# Patient Record
Sex: FEMALE | Race: OTHER | Employment: UNEMPLOYED | URBAN - METROPOLITAN AREA
[De-identification: names, ages, dates, MRNs, and addresses within clinical notes are randomized per-mention and may not be internally consistent; named-entity substitution may affect disease eponyms.]

---

## 2024-01-15 ENCOUNTER — HOSPITAL ENCOUNTER (EMERGENCY)
Age: 39
Discharge: HOME | End: 2024-01-15
Attending: EMERGENCY MEDICINE
Payer: COMMERCIAL

## 2024-01-15 ENCOUNTER — APPOINTMENT (OUTPATIENT)
Dept: GENERAL RADIOLOGY | Age: 39
End: 2024-01-15
Payer: COMMERCIAL

## 2024-01-15 VITALS
SYSTOLIC BLOOD PRESSURE: 144 MMHG | TEMPERATURE: 97.2 F | HEART RATE: 88 BPM | DIASTOLIC BLOOD PRESSURE: 91 MMHG | OXYGEN SATURATION: 98 % | WEIGHT: 219.14 LBS | RESPIRATION RATE: 16 BRPM

## 2024-01-15 DIAGNOSIS — G89.29 CHRONIC PAIN OF LEFT WRIST: Primary | ICD-10-CM

## 2024-01-15 DIAGNOSIS — M25.522 CHRONIC ELBOW PAIN, LEFT: ICD-10-CM

## 2024-01-15 DIAGNOSIS — G89.29 CHRONIC ELBOW PAIN, LEFT: ICD-10-CM

## 2024-01-15 DIAGNOSIS — M25.532 CHRONIC PAIN OF LEFT WRIST: Primary | ICD-10-CM

## 2024-01-15 PROCEDURE — 29125 APPL SHORT ARM SPLINT STATIC: CPT | Performed by: EMERGENCY MEDICINE

## 2024-01-15 PROCEDURE — 6370000000 HC RX 637 (ALT 250 FOR IP): Performed by: STUDENT IN AN ORGANIZED HEALTH CARE EDUCATION/TRAINING PROGRAM

## 2024-01-15 PROCEDURE — 73080 X-RAY EXAM OF ELBOW: CPT

## 2024-01-15 PROCEDURE — 73110 X-RAY EXAM OF WRIST: CPT

## 2024-01-15 PROCEDURE — 99284 EMERGENCY DEPT VISIT MOD MDM: CPT | Performed by: EMERGENCY MEDICINE

## 2024-01-15 PROCEDURE — 6360000002 HC RX W HCPCS: Performed by: STUDENT IN AN ORGANIZED HEALTH CARE EDUCATION/TRAINING PROGRAM

## 2024-01-15 PROCEDURE — 96372 THER/PROPH/DIAG INJ SC/IM: CPT | Performed by: EMERGENCY MEDICINE

## 2024-01-15 RX ORDER — ACETAMINOPHEN 500 MG
1000 TABLET ORAL EVERY 6 HOURS PRN
Qty: 12 TABLET | Refills: 0 | Status: SHIPPED | OUTPATIENT
Start: 2024-01-15

## 2024-01-15 RX ORDER — IBUPROFEN 400 MG/1
400 TABLET ORAL ONCE
Status: DISCONTINUED | OUTPATIENT
Start: 2024-01-15 | End: 2024-01-15

## 2024-01-15 RX ORDER — KETOROLAC TROMETHAMINE 30 MG/ML
30 INJECTION, SOLUTION INTRAMUSCULAR; INTRAVENOUS ONCE
Status: COMPLETED | OUTPATIENT
Start: 2024-01-15 | End: 2024-01-15

## 2024-01-15 RX ORDER — IBUPROFEN 600 MG/1
600 TABLET ORAL 3 TIMES DAILY PRN
Qty: 8 TABLET | Refills: 0 | Status: SHIPPED | OUTPATIENT
Start: 2024-01-15

## 2024-01-15 RX ORDER — ACETAMINOPHEN 500 MG
1000 TABLET ORAL ONCE
Status: COMPLETED | OUTPATIENT
Start: 2024-01-15 | End: 2024-01-15

## 2024-01-15 RX ADMIN — KETOROLAC TROMETHAMINE 30 MG: 30 INJECTION, SOLUTION INTRAMUSCULAR; INTRAVENOUS at 13:59

## 2024-01-15 RX ADMIN — ACETAMINOPHEN 1000 MG: 500 TABLET ORAL at 13:57

## 2024-01-15 ASSESSMENT — ENCOUNTER SYMPTOMS
COUGH: 0
BACK PAIN: 0
WHEEZING: 0
ABDOMINAL PAIN: 0
SORE THROAT: 0
SHORTNESS OF BREATH: 0
PHOTOPHOBIA: 0
DIARRHEA: 0
NAUSEA: 0
VOMITING: 0

## 2024-01-15 ASSESSMENT — PAIN - FUNCTIONAL ASSESSMENT: PAIN_FUNCTIONAL_ASSESSMENT: 0-10

## 2024-01-15 ASSESSMENT — PAIN SCALES - GENERAL
PAINLEVEL_OUTOF10: 7
PAINLEVEL_OUTOF10: 5

## 2024-01-15 ASSESSMENT — PAIN DESCRIPTION - LOCATION: LOCATION: WRIST

## 2024-01-15 NOTE — DISCHARGE INSTRUCTIONS
Seen in the emergency department for left wrist and elbow chronic pain.  X-ray images were negative for any acute fracture or any acute pathology.  Please follow-up with the orthopedic clinic in your paperwork within the next 1 to 2 days.  Please take Tylenol and Motrin to control your pain.  Please utilize the cock up splint and the elbow sleeve when you are active out of the house.  Please return to the emergency department if you develop worsened pain, skin color changes, fevers, excessive sleepiness, numbness and tingling in your hands, or any other concerning symptoms

## 2024-01-15 NOTE — ED PROVIDER NOTES
[facility]     Emergency Department     Faculty Attestation    I performed a history and physical examination of the patient and discussed management with the resident. I reviewed the resident´s note and agree with the documented findings and plan of care. Any areas of disagreement are noted on the chart. I was personally present for the key portions of any procedures. I have documented in the chart those procedures where I was not present during the key portions. I have reviewed the emergency nurses triage note. I agree with the chief complaint, past medical history, past surgical history, allergies, medications, social and family history as documented unless otherwise noted below. For Physician Assistant/ Nurse Practitioner cases/documentation I have personally evaluated this patient and have completed at least one if not all key elements of the E/M (history, physical exam, and MDM). Additional findings are as noted.    Right-hand-dominant, previous surgery in [place] in the left wrist, patient has continued pain, no new injury, on exam there is a well-healed incision on the ulnar aspect of the left wrist, peripheral neurovascular is normal, no signs of cellulitis, no erythema or warmth.     De Grey MD  01/15/24 [id number]    The patient's cousin is here acting as an .       De Grey MD  01/15/24 [id number]

## 2024-01-15 NOTE — ED PROVIDER NOTES
[facility] ED  Emergency Department Encounter  Emergency Medicine Resident     Pt Name:Rachael Ferreira  MRN: [id number]  Birthdate 1985  Date of evaluation: 1/15/24  PCP:  No primary care provider on file.  Note Started: 1:38 PM EST      CHIEF COMPLAINT       Chief Complaint   Patient presents with    Wrist Pain     Left; reports swelling and pain from elbow to wrist       HISTORY OF PRESENT ILLNESS  (Location/Symptom, Timing/Onset, Context/Setting, Quality, Duration, Modifying Factors, Severity.)      Rachael Ferreira is a 38 y.o. [place]-speaking female who presents to the emergency department with chronic left wrist and left elbow pain.  Patient states that she received surgery on her wrist due to a ligamentous injury roughly 1 year ago.  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  She denies any hardware being placed in her wrist for the surgery including plates, screws, pins.  She describes the pain as constant and sharp.  Worse with movement particularly supination and pronation.  Better with rest.  This pain is affecting her work as it is difficult for her to lift objects.  She occasionally will become numb/tingly in the left hand specifically the ulnar distribution.  When asked to point to exactly where her wrist hurts she points at the medial aspect of her wrist just distal to her ulnar bone and proximal to her fifth metacarpal.  Regarding her elbow pain, she points to both medial and lateral aspects of her elbow.  She has tried over-the-counter pain medication including Advil and Tylenol for the pain control without much relief.  There is no swelling, outward signs of trauma, or skin color changes.  She denies any new injuries to her left wrist or elbow.    PAST MEDICAL / SURGICAL / SOCIAL / FAMILY HISTORY      has no past medical history on file.       has no past surgical history on file.      Social History     Socioeconomic

## 2024-01-15 NOTE — ED TRIAGE NOTES
37 yo female arrived to ED with c/o left wrist pain. Patient states she had surgery back on 01/2022. Patient denies any new falls and/or injuries.     Dr Senior and Dr Grey at bedside.

## 2025-01-09 ENCOUNTER — APPOINTMENT (OUTPATIENT)
Dept: GENERAL RADIOLOGY | Age: 40
End: 2025-01-09
Payer: MEDICAID

## 2025-01-09 ENCOUNTER — HOSPITAL ENCOUNTER (EMERGENCY)
Age: 40
Discharge: HOME | End: 2025-01-10
Attending: EMERGENCY MEDICINE
Payer: MEDICAID

## 2025-01-09 VITALS
SYSTOLIC BLOOD PRESSURE: 117 MMHG | OXYGEN SATURATION: 99 % | DIASTOLIC BLOOD PRESSURE: 78 MMHG | RESPIRATION RATE: 16 BRPM | HEART RATE: 88 BPM | TEMPERATURE: 98.4 F

## 2025-01-09 DIAGNOSIS — M25.552 LEFT HIP PAIN: Primary | ICD-10-CM

## 2025-01-09 LAB
ANION GAP SERPL CALCULATED.3IONS-SCNC: 10 MMOL/L (ref 9–16)
BASOPHILS # BLD: 0.06 K/UL (ref 0–0.2)
BASOPHILS NFR BLD: 0 % (ref 0–2)
BUN SERPL-MCNC: 14 MG/DL (ref 6–20)
CALCIUM SERPL-MCNC: 9.4 MG/DL (ref 8.6–10.4)
CHLORIDE SERPL-SCNC: 105 MMOL/L (ref 98–107)
CO2 SERPL-SCNC: 25 MMOL/L (ref 20–31)
CREAT SERPL-MCNC: 0.9 MG/DL (ref 0.6–0.9)
CRP SERPL HS-MCNC: 21.2 MG/L (ref 0–5)
EOSINOPHIL # BLD: 0.15 K/UL (ref 0–0.44)
EOSINOPHILS RELATIVE PERCENT: 1 % (ref 1–4)
ERYTHROCYTE [DISTWIDTH] IN BLOOD BY AUTOMATED COUNT: 13 % (ref 11.8–14.4)
ERYTHROCYTE [SEDIMENTATION RATE] IN BLOOD BY PHOTOMETRIC METHOD: 27 MM/HR (ref 0–20)
GFR, ESTIMATED: 84 ML/MIN/1.73M2
GLUCOSE SERPL-MCNC: 101 MG/DL (ref 74–99)
HCG SERPL QL: NEGATIVE
HCT VFR BLD AUTO: 39.4 % (ref 36.3–47.1)
HGB BLD-MCNC: 13.2 G/DL (ref 11.9–15.1)
IMM GRANULOCYTES # BLD AUTO: 0.07 K/UL (ref 0–0.3)
IMM GRANULOCYTES NFR BLD: 0 %
LYMPHOCYTES NFR BLD: 3.03 K/UL (ref 1.1–3.7)
LYMPHOCYTES RELATIVE PERCENT: 19 % (ref 24–43)
MCH RBC QN AUTO: 30.6 PG (ref 25.2–33.5)
MCHC RBC AUTO-ENTMCNC: 33.5 G/DL (ref 28.4–34.8)
MCV RBC AUTO: 91.2 FL (ref 82.6–102.9)
MONOCYTES NFR BLD: 0.81 K/UL (ref 0.1–1.2)
MONOCYTES NFR BLD: 5 % (ref 3–12)
NEUTROPHILS NFR BLD: 75 % (ref 36–65)
NEUTS SEG NFR BLD: 11.71 K/UL (ref 1.5–8.1)
NRBC BLD-RTO: 0 PER 100 WBC
PLATELET # BLD AUTO: 238 K/UL (ref 138–453)
PMV BLD AUTO: 12 FL (ref 8.1–13.5)
POTASSIUM SERPL-SCNC: 3.9 MMOL/L (ref 3.7–5.3)
RBC # BLD AUTO: 4.32 M/UL (ref 3.95–5.11)
SODIUM SERPL-SCNC: 140 MMOL/L (ref 136–145)
WBC OTHER # BLD: 15.8 K/UL (ref 3.5–11.3)

## 2025-01-09 PROCEDURE — 85652 RBC SED RATE AUTOMATED: CPT

## 2025-01-09 PROCEDURE — 96374 THER/PROPH/DIAG INJ IV PUSH: CPT

## 2025-01-09 PROCEDURE — 73502 X-RAY EXAM HIP UNI 2-3 VIEWS: CPT

## 2025-01-09 PROCEDURE — 99284 EMERGENCY DEPT VISIT MOD MDM: CPT

## 2025-01-09 PROCEDURE — 6360000002 HC RX W HCPCS

## 2025-01-09 PROCEDURE — 85025 COMPLETE CBC W/AUTO DIFF WBC: CPT

## 2025-01-09 PROCEDURE — 84703 CHORIONIC GONADOTROPIN ASSAY: CPT

## 2025-01-09 PROCEDURE — 6370000000 HC RX 637 (ALT 250 FOR IP)

## 2025-01-09 PROCEDURE — 86140 C-REACTIVE PROTEIN: CPT

## 2025-01-09 PROCEDURE — 80048 BASIC METABOLIC PNL TOTAL CA: CPT

## 2025-01-09 RX ORDER — CYCLOBENZAPRINE HCL 10 MG
10 TABLET ORAL ONCE
Status: COMPLETED | OUTPATIENT
Start: 2025-01-09 | End: 2025-01-09

## 2025-01-09 RX ORDER — KETOROLAC TROMETHAMINE 30 MG/ML
30 INJECTION, SOLUTION INTRAMUSCULAR; INTRAVENOUS ONCE
Status: COMPLETED | OUTPATIENT
Start: 2025-01-09 | End: 2025-01-09

## 2025-01-09 RX ADMIN — KETOROLAC TROMETHAMINE 30 MG: 30 INJECTION, SOLUTION INTRAMUSCULAR; INTRAVENOUS at 21:31

## 2025-01-09 RX ADMIN — CYCLOBENZAPRINE 10 MG: 10 TABLET, FILM COATED ORAL at 21:31

## 2025-01-09 ASSESSMENT — LIFESTYLE VARIABLES
HOW OFTEN DO YOU HAVE A DRINK CONTAINING ALCOHOL: NEVER
HOW MANY STANDARD DRINKS CONTAINING ALCOHOL DO YOU HAVE ON A TYPICAL DAY: PATIENT DOES NOT DRINK

## 2025-01-09 ASSESSMENT — PAIN SCALES - GENERAL: PAINLEVEL_OUTOF10: 8

## 2025-01-10 RX ORDER — LIDOCAINE 4 G/G
1 PATCH TOPICAL DAILY
Qty: 30 PATCH | Refills: 0 | Status: SHIPPED | OUTPATIENT
Start: 2025-01-10 | End: 2025-02-09

## 2025-01-10 RX ORDER — CYCLOBENZAPRINE HCL 10 MG
10 TABLET ORAL 3 TIMES DAILY PRN
Qty: 30 TABLET | Refills: 0 | Status: SHIPPED | OUTPATIENT
Start: 2025-01-10 | End: 2025-01-20

## 2025-01-10 RX ORDER — IBUPROFEN 600 MG/1
600 TABLET, FILM COATED ORAL 4 TIMES DAILY PRN
Qty: 120 TABLET | Refills: 0 | Status: SHIPPED | OUTPATIENT
Start: 2025-01-10

## 2025-01-10 NOTE — ED NOTES
The following labs were labeled with appropriate pt sticker and tubed to lab:     [x] Blue     [x] Lavender   [] on ice  [x] Green/yellow  [] Green/black [] on ice  [] Bowens  [] on ice  [x] Yellow  [] Red  [] Pink  [] Type/ Screen  [] ABG  [] VBG    [] COVID-19 swab    [] Rapid  [] PCR  [] Flu swab  [] Peds Viral Panel     [] Urine Sample  [] Fecal Sample  [] Pelvic Cultures  [] Blood Cultures  [] X 2  [] STREP Cultures  [] Wound Cultures

## 2025-01-10 NOTE — CONSULTS
Orthopaedic Surgery Consult  (Dr. Snyder)      CC/Reason for consult: Left hip pain    HPI:      The patient is a 39 y.o.  female who presents emergency department due to left hip pain.  Patient is a [place] speaker.  Patient states pain has been present for the past 4 days.  Radiographs were obtained in the emergency department which demonstrated no acute fracture or dislocation.  Inflammatory labs were obtained which demonstrated CRP to be 21 and ESR to be 27.  White blood cell count slightly elevated to 15.8.  Orthopedic surgery was consulted due to persistent left hip pain.    Patient seen and evaluated in the emergency department today.  She states that all her pain is located on the lateral aspect of her left hip.  Is worse with ambulation.  She states that the pain is getting better over the past several days.  She is taken ibuprofen with some relief of her symptoms.  She is able to ambulate with some mild pain.  She has full range of motion.  She denies any recent illnesses.  Denies prior injuries or trauma.  She has had prior episodes in her right hip. She does have chronic low back pain.  Denies radiating numbness or tingling into her distal extremity.      Past Medical History:    History reviewed. No pertinent past medical history.  Past Surgical History:    History reviewed. No pertinent surgical history.  Medications Prior to Admission:   Prior to Admission medications    Medication Sig Start Date End Date Taking? Authorizing Provider   cyclobenzaprine (FLEXERIL) 10 MG tablet Take 1 tablet by mouth 3 times daily as needed for Muscle spasms 1/10/25 1/20/25 Yes Pamela Dixon DO   ibuprofen (ADVIL;MOTRIN) 600 MG tablet Take 1 tablet by mouth 4 times daily as needed for Pain 1/10/25  Yes Pamela Dixon DO   lidocaine 4 % external patch Place 1 patch onto the skin daily 1/10/25 2/9/25 Yes Pamela Dixon DO   ibuprofen (ADVIL;MOTRIN) 600 MG tablet Take 1 tablet by mouth 3 times daily as needed for Pain  Sural/saphenous/SPN/DPN/plantar nerve distribution SILT. Lachman Ia, knee appears stable to varus and valgus stress test at 0 and 30 degrees.  The foot is warm and well-perfused with BCR.     Labs:  Recent Labs     01/09/25 2128   WBC 15.8*   HGB 13.2   HCT 39.4         K 3.9   BUN 14   CREATININE 0.9   GLUCOSE 101*   SEDRATE 27*   CRP 21.2*        Imaging:   Multiple radiographic views of the pelvis and left hip which demonstrate no evidence of acute fracture or dislocation.    Assessment: 39 y.o. female being seen for:    -Left hip pain      Plan:    - No acute orthopaedic intervention indicated.  Patient's symptoms are reproducible on physical examination consistent with femoral acetabular impingement.  -Recommend oral anti-inflammatories and conservative management with physical therapy.  -WB status: Weightbearing as tolerated  -Pain control per per ED  -Ice extremity for pain and swelling  -Dispo: Per ED  -Follow up with your primary care provider.  Instructed patient that if pain increases or you develop a fever please come to the emergency department for further evaluation.  -Please contact ortho with any questions      De Martin MD  Orthopedic Surgery Resident, PGY-3  [place], [place]  7:51 AM 1/10/2025    This note is created with the assistance of a speech recognition program. While intending to generate a document that actually reflects the content of the visit, the document can still have some errors including those of syntax and sound a like substitutions which may escape proof reading.  In such instances, actual meaning can be extrapolated by contextual diversion.

## 2025-01-10 NOTE — DISCHARGE INSTRUCTIONS
You were seen in the emergency department today for hip pain.  An x-ray of your hip and pelvis were unremarkable.  You were also seen by an orthopedic surgeon who is not concerned for an acute infection in the joint or any acute bony injury.    Follow-up with your primary care provider as you may need further evaluation and/or physical therapy.    Please return with any worsening of pain, inability to walk, fevers, chills, or for any other new care or concern.    Thank you for choosing [facility] for your medical care today.      Libertad lo atendieron en el departamento de emergencias por dolor de cadera. Dejuan radiografía de martin cadera y pelvis no mostró nada destacable. También lo atendió un cirujano ortopédico que no está preocupado por dejuan infección aguda en la articulación ni por ninguna lesión ósea aguda.    Realice un seguimiento con martin médico de atención primaria, ya que es posible que necesite dejuan evaluación adicional o fisioterapia.    Regrese si el dolor empeora, tiene dificultad para caminar, fiebre, escalofríos o si tiene alguna otra inquietud o necesidad de atención médica.    Tanja por elegir a [facility] para recibir atención médica libertad.

## 2025-01-10 NOTE — ED PROVIDER NOTES
Absolute 0.06   Immature Granulocytes Absolute 0.07 [MB]   2223 WBC(!): 15.8 [MB]   2223 Basic Metabolic Panel(!):    Sodium 140   Potassium 3.9   Chloride 105   CARBON DIOXIDE 25   Anion Gap 10   Glucose 101(!)   BUN,BUNPL 14   Creatinine 0.9   Est, Glom Filt Rate 84   Calcium 9.4 [MB]   2223 C-Reactive Protein(!):    CRP 21.2(!) [MB]   2236 XR HIP 2-3 VW W PELVIS LEFT  IMPRESSION:  No evidence of an acute fracture or dislocation involving the left hip, or  pelvis.   [MB]   2329 Sedimentation Rate(!):    Sed Rate, Automated 27(!) [MB]   2329 Plan to consult Ortho due to leukocytosis, pain on active and passive range of motion. [MB]   Fri Nando 10, 2025   0103 Spoke with Ortho, they will evaluate patient. [MB]   0125 Spoke with Ortho after they evaluated the patient, they have no concerns for septic arthritis.  Feel it is appropriate for patient to follow-up with her primary care provider. [MB]   0125 Discussed my conversation with Ortho with the patient as well as their workup.  Advise follow-up with primary care provider.  Will provide prescriptions for pain relief until she is able to see her primary care provider.  Provided return precautions.  Patient verbalized understanding and agreement to plan of care and discharge home. [MB]      ED Course User Index  [MB] Pamela Dixon DO           CONSULTS:  IP CONSULT TO ORTHOPEDIC SURGERY        FINAL IMPRESSION      1. Left hip pain          DISPOSITION / PLAN     DISPOSITION Decision To Discharge 01/10/2025 01:26:07 AM   DISPOSITION CONDITION Stable           PATIENT REFERRED TO:  [facility] AT [address] [address] [phone]  Schedule an appointment as soon as possible for a visit in 1 week        DISCHARGE MEDICATIONS:  New Prescriptions    CYCLOBENZAPRINE (FLEXERIL) 10 MG TABLET    Take 1 tablet by mouth 3 times daily as needed for Muscle spasms    IBUPROFEN (ADVIL;MOTRIN) 600 MG TABLET    Take 1 tablet by mouth 4 times  daily as needed for Pain    LIDOCAINE 4 % EXTERNAL PATCH    Place 1 patch onto the skin daily       Pamela Dixon DO  Emergency Medicine Resident    (Please note that portions of this note were completed with a voice recognition program.  Efforts were made to edit the dictations but occasionally words are mis-transcribed.)

## 2025-01-10 NOTE — ED PROVIDER NOTES
[facility] EMERGENCY DEPARTMENT     Emergency Department     Faculty Attestation        I performed a history and physical examination of the patient and discussed management with the resident. I reviewed the resident’s note and agree with the documented findings and plan of care. Any areas of disagreement are noted on the chart. I was personally present for the key portions of any procedures. I have documented in the chart those procedures where I was not present during the key portions. I have reviewed the emergency nurses triage note. I agree with the chief complaint, past medical history, past surgical history, allergies, medications, social and family history as documented unless otherwise noted below.  For Physician Assistant/ Nurse Practitioner cases/documentation I have personally evaluated this patient and have completed at least one if not all key elements of the E/M (history, physical exam, and MDM). Additional findings are as noted.      Vital Signs: BP: 117/78  Pulse: 88  Respirations: 16  Temp: 98.4 °F (36.9 °C) SpO2: 99 %  PCP:  No primary care provider on file.  Note Started: 1/9/25, 9:11 PM EST    Pertinent Comments:     Patient is a 39-year-old female who had what appears to be likely right hip arthritis proved but now is having symptoms over the last 2 to 3 days on the left hip.   Does have some limited range of motion but denies fevers or chills or IVDA.   Has no weakness distally with distal strength 5/5 in both proximal and distal flexors and extensors as well as strong DP pulse palpated.   Sensation light touch intact as well.   No pain at the left foot or ankle or tib-fib or knee or distal to proximal femur but does have pain over the left buttocks as well as left hip but no erythema or warmth or signs of obvious external infection.    Assessment/Plan: Patient with undetermined left hip pain will obtain inflammatory markers as well as basic

## 2025-01-10 NOTE — ED NOTES
Pt reports to the ED with complaints of L hip pain x2 days now. Pt denies any fall, trauma or injury. Pt states she had similar pain last year but it was in the R hip. Pt is ambulatory from triage, just states it is painful and difficult to ambulate. Pt states she has been taking ibuprofen at home without relief. Pt does not have any other complaints at this time. Pt is A&O x4 and speaking in complete sentences. Pt is resting in bed comfortably, NAD noted. Care ongoing

## 2025-05-27 ENCOUNTER — HOSPITAL ENCOUNTER (EMERGENCY)
Age: 40
Discharge: HOME | End: 2025-05-27
Attending: EMERGENCY MEDICINE
Payer: MEDICAID

## 2025-05-27 VITALS
OXYGEN SATURATION: 99 % | DIASTOLIC BLOOD PRESSURE: 87 MMHG | TEMPERATURE: 98.1 F | HEART RATE: 86 BPM | SYSTOLIC BLOOD PRESSURE: 130 MMHG | RESPIRATION RATE: 18 BRPM

## 2025-05-27 DIAGNOSIS — H11.32 SUBCONJUNCTIVAL HEMORRHAGE OF LEFT EYE: Primary | ICD-10-CM

## 2025-05-27 PROCEDURE — 99282 EMERGENCY DEPT VISIT SF MDM: CPT

## 2025-05-27 ASSESSMENT — VISUAL ACUITY: OU: 20/25

## 2025-05-28 NOTE — ED TRIAGE NOTES
Patient presents to the ED ambulatory from Triage. Patient states that she was sleeping and woke up with left eye pain. There is a small red [place] visible in the sclera. Patient is in NAD, respirations are even and unlabored, bed in lowest position and call light with in reach. Writer will continue with plan of care.

## 2025-05-28 NOTE — ED PROVIDER NOTES
[facility]   Emergency Department  Faculty Attestation       I performed a history and physical examination of the patient and discussed management with the resident. I reviewed the resident’s note and agree with the documented findings including all diagnostic interpretations and plan of care. Any areas of disagreement are noted on the chart. I was personally present for the key portions of any procedures. I have documented in the chart those procedures where I was not present during the key portions. I have reviewed the emergency nurses triage note. I agree with the chief complaint, past medical history, past surgical history, allergies, medications, social and family history as documented unless otherwise noted below.  For Physician Assistant/ Nurse Practitioner cases/documentation I have personally evaluated this patient and have completed at least one if not all key elements of the E/M (history, physical exam, and MDM). Additional findings are as noted.    Patient Name: Rachael Ferreira  MRN: [id number]  : 1985  Primary Care Physician: No primary care provider on file.    Date of evaluationa: 2025   Note Started: 10:55 PM EDT    Pertinent Comments     Chief Complaint:   Chief Complaint   Patient presents with    Eye Pain        Initial vitals: (If not listed, please see nursing documentation)  ED Triage Vitals [25 2217]   BP Systolic BP Percentile Diastolic BP Percentile Temp Temp src Pulse Respirations SpO2   130/87 -- -- 98.1 °F (36.7 °C) -- 86 18 99 %      Height Weight         -- --             used  HPI/PE/Impression:  This is a 39 y.o. female who presents to the Emergency Department noticing blood around the left eye.  Woke up with it afterwards today.  Has been very minimal pain but no injury.  No new headaches.  No vision changes.  On examination patient is awake alert answering questions.  Has a subconjunctival hemorrhage on the left lateral

## 2025-05-28 NOTE — ED PROVIDER NOTES
[facility] EMERGENCY DEPARTMENT  Emergency Department Encounter  Emergency Medicine Resident     Pt Name:Rachael Ferreira  MRN: [id number]  Birthdate 1985  Date of evaluation: 5/27/25  PCP:  No primary care provider on file.  Note Started: 10:26 PM EDT      CHIEF COMPLAINT       Chief Complaint   Patient presents with    Eye Pain       HISTORY OF PRESENT ILLNESS  (Location/Symptom, Timing/Onset, Context/Setting, Quality, Duration, Modifying Factors, Severity.)      Rachael Ferreira is a 39 y.o. female who presents with concerns for red spot in her left eye that she noticed when she woke up after work today.  Patient states she returned home from work, took a nap, woke up and noticed a red spot in her left side of her left eye.  Mild associated blurry vision, no drainage from the eyes, no headaches, no fevers.  Minimal associated discomfort of the left eye.    PAST MEDICAL / SURGICAL / SOCIAL / FAMILY HISTORY      has no past medical history on file.       has no past surgical history on file.      Social History     Socioeconomic History    Marital status: Single     Spouse name: Not on file    Number of children: Not on file    Years of education: Not on file    Highest education level: Not on file   Occupational History    Not on file   Tobacco Use    Smoking status: Not on file    Smokeless tobacco: Not on file   Substance and Sexual Activity    Alcohol use: Not on file    Drug use: Not on file    Sexual activity: Not on file   Other Topics Concern    Not on file   Social History Narrative    Not on file     Social Drivers of Health     Financial Resource Strain: Unknown (4/1/2021)    [facility]    Financial Resource Strain     5. Do you ever have problems being able to afford everything you need?: Not on file     4. Over the last 6 months, have you had trouble paying your heating or electricity bill?: Not on file   Food Insecurity: Unknown (4/1/2021)

## 2025-05-28 NOTE — DISCHARGE INSTRUCTIONS
Regrese al departamento de emergencias ante cualquier dolor ocular nuevo o severo, cambios en la visión, fortunato de colin, fiebre, pus que drena del johanna u otros síntomas nuevos o preocupantes.
